# Patient Record
Sex: MALE | Race: WHITE | Employment: OTHER | URBAN - METROPOLITAN AREA
[De-identification: names, ages, dates, MRNs, and addresses within clinical notes are randomized per-mention and may not be internally consistent; named-entity substitution may affect disease eponyms.]

---

## 2023-08-07 SDOH — ECONOMIC STABILITY: FOOD INSECURITY: WITHIN THE PAST 12 MONTHS, YOU WORRIED THAT YOUR FOOD WOULD RUN OUT BEFORE YOU GOT MONEY TO BUY MORE.: NEVER TRUE

## 2023-08-07 SDOH — ECONOMIC STABILITY: TRANSPORTATION INSECURITY
IN THE PAST 12 MONTHS, HAS LACK OF TRANSPORTATION KEPT YOU FROM MEETINGS, WORK, OR FROM GETTING THINGS NEEDED FOR DAILY LIVING?: NO

## 2023-08-07 SDOH — ECONOMIC STABILITY: INCOME INSECURITY: HOW HARD IS IT FOR YOU TO PAY FOR THE VERY BASICS LIKE FOOD, HOUSING, MEDICAL CARE, AND HEATING?: NOT HARD AT ALL

## 2023-08-07 SDOH — ECONOMIC STABILITY: FOOD INSECURITY: WITHIN THE PAST 12 MONTHS, THE FOOD YOU BOUGHT JUST DIDN'T LAST AND YOU DIDN'T HAVE MONEY TO GET MORE.: NEVER TRUE

## 2023-08-07 SDOH — ECONOMIC STABILITY: HOUSING INSECURITY
IN THE LAST 12 MONTHS, WAS THERE A TIME WHEN YOU DID NOT HAVE A STEADY PLACE TO SLEEP OR SLEPT IN A SHELTER (INCLUDING NOW)?: NO

## 2023-08-08 ENCOUNTER — OFFICE VISIT (OUTPATIENT)
Age: 73
End: 2023-08-08
Payer: MEDICARE

## 2023-08-08 VITALS
BODY MASS INDEX: 26.7 KG/M2 | DIASTOLIC BLOOD PRESSURE: 71 MMHG | WEIGHT: 176.2 LBS | HEART RATE: 57 BPM | SYSTOLIC BLOOD PRESSURE: 143 MMHG | RESPIRATION RATE: 12 BRPM | HEIGHT: 68 IN

## 2023-08-08 DIAGNOSIS — N40.0 BENIGN PROSTATIC HYPERPLASIA, UNSPECIFIED WHETHER LOWER URINARY TRACT SYMPTOMS PRESENT: ICD-10-CM

## 2023-08-08 DIAGNOSIS — N62 GYNECOMASTIA, MALE: ICD-10-CM

## 2023-08-08 DIAGNOSIS — I10 PRIMARY HYPERTENSION: Primary | ICD-10-CM

## 2023-08-08 DIAGNOSIS — E78.2 MIXED HYPERLIPIDEMIA: ICD-10-CM

## 2023-08-08 DIAGNOSIS — M72.0 DUPUYTREN CONTRACTURE: ICD-10-CM

## 2023-08-08 PROBLEM — E78.5 HYPERLIPIDEMIA: Status: ACTIVE | Noted: 2023-08-08

## 2023-08-08 PROCEDURE — 1123F ACP DISCUSS/DSCN MKR DOCD: CPT | Performed by: FAMILY MEDICINE

## 2023-08-08 PROCEDURE — 3077F SYST BP >= 140 MM HG: CPT | Performed by: FAMILY MEDICINE

## 2023-08-08 PROCEDURE — 99213 OFFICE O/P EST LOW 20 MIN: CPT | Performed by: FAMILY MEDICINE

## 2023-08-08 PROCEDURE — 3078F DIAST BP <80 MM HG: CPT | Performed by: FAMILY MEDICINE

## 2023-08-08 RX ORDER — SILICONE ADHESIVE 1.5" X 3"
1 SHEET (EA) TOPICAL DAILY
COMMUNITY

## 2023-08-08 RX ORDER — VIT C/B6/B5/MAGNESIUM/HERB 173 50-5-6-5MG
1000 CAPSULE ORAL
COMMUNITY
Start: 2021-09-23

## 2023-08-08 RX ORDER — CALCIUM CARBONATE/VITAMIN D3 600 MG-10
1200 TABLET ORAL
COMMUNITY
Start: 2021-09-23

## 2023-08-08 RX ORDER — CEPHRADINE 500 MG
1 CAPSULE ORAL EVERY OTHER DAY
COMMUNITY

## 2023-08-08 RX ORDER — SILDENAFIL 25 MG/1
25 TABLET, FILM COATED ORAL DAILY PRN
COMMUNITY
End: 2023-08-08

## 2023-08-08 RX ORDER — ASCORBIC ACID 500 MG
50 TABLET ORAL DAILY
COMMUNITY
Start: 2021-09-23

## 2023-08-08 RX ORDER — M-VIT,TX,IRON,MINS/CALC/FOLIC 27MG-0.4MG
1 TABLET ORAL DAILY
COMMUNITY

## 2023-08-08 RX ORDER — MULTIVITAMIN WITH IRON
8000 TABLET ORAL DAILY
COMMUNITY

## 2023-08-08 RX ORDER — ASCORBIC ACID 250 MG
250 TABLET ORAL
COMMUNITY
Start: 2021-09-23

## 2023-08-08 RX ORDER — ASPIRIN 81 MG/1
81 TABLET, CHEWABLE ORAL DAILY
Qty: 30 TABLET | Refills: 3 | Status: SHIPPED | OUTPATIENT
Start: 2023-08-08

## 2023-08-08 ASSESSMENT — ENCOUNTER SYMPTOMS
SINUS PRESSURE: 0
FACIAL SWELLING: 0
DIARRHEA: 0
APNEA: 0
SINUS PAIN: 0
BLOOD IN STOOL: 0
CONSTIPATION: 0
PHOTOPHOBIA: 0
CHEST TIGHTNESS: 0
WHEEZING: 0
BACK PAIN: 0
COUGH: 0

## 2023-08-08 ASSESSMENT — PATIENT HEALTH QUESTIONNAIRE - PHQ9
SUM OF ALL RESPONSES TO PHQ QUESTIONS 1-9: 0
SUM OF ALL RESPONSES TO PHQ9 QUESTIONS 1 & 2: 0
SUM OF ALL RESPONSES TO PHQ QUESTIONS 1-9: 0
2. FEELING DOWN, DEPRESSED OR HOPELESS: 0
SUM OF ALL RESPONSES TO PHQ QUESTIONS 1-9: 0
1. LITTLE INTEREST OR PLEASURE IN DOING THINGS: 0
SUM OF ALL RESPONSES TO PHQ QUESTIONS 1-9: 0

## 2023-08-08 NOTE — ASSESSMENT & PLAN NOTE
If you are not having more than 2 episodes at night to get up and no significant change in stream then a PSA is not indicated based on age.

## 2023-08-08 NOTE — PATIENT INSTRUCTIONS
We will arrange for you to have some fasting testing. Based on your testing will determine if statin is still indicated. For now we will continue to monitor your blood pressures at home and add chewable aspirin daily. An average BP at home of 120/74 is excellent. Have placed referral in for Dr. Dionte Gore orthopedics to evaluate your Dupuytren's disease.

## 2023-08-08 NOTE — ASSESSMENT & PLAN NOTE
Bring you cuff in for every exam.  Then we use the cuff as our method. Add the 81 mg of chewable every night.

## 2023-08-08 NOTE — ASSESSMENT & PLAN NOTE
Greater than 110 depress loss of the 5th finger and over 90 degrees of the left ring, about 40 degrees of the right ring. Right hand dominant. Want to find out who I would recommend in [facility].

## 2023-08-08 NOTE — PROGRESS NOTES
[facility]  [address] [facility], [address] [facility]  Phone: [phone]  Fax: [phone]       Date of Visit:  2023  Patient Name: Khadra Chavez   Patient :  1950     CHIEF COMPLAINT:     Khadra Chavez is a 75 y.o. male who presents today for an general visit to be evaluated for the following condition(s):    -Follow-up hypertension  -Contracture left greater than right palm. REVIEW OF SYSTEM      Review of Systems   Constitutional:  Negative for activity change, appetite change, fatigue and fever. HENT:  Negative for congestion, facial swelling, hearing loss, nosebleeds, sinus pressure and sinus pain. Eyes:  Negative for photophobia and visual disturbance. Respiratory:  Negative for apnea, cough, chest tightness and wheezing. Cardiovascular:  Negative for chest pain, palpitations and leg swelling. Gastrointestinal:  Negative for blood in stool, constipation and diarrhea. Endocrine: Negative for cold intolerance and heat intolerance. Genitourinary:  Negative for decreased urine volume, difficulty urinating, dysuria and urgency. Musculoskeletal:  Negative for back pain, gait problem and neck pain. Contracture left greater than right hand   Skin:  Negative for rash and wound. Neurological:  Negative for dizziness, seizures, weakness, light-headedness and headaches. Hematological:  Negative for adenopathy. Does not bruise/bleed easily. Psychiatric/Behavioral:  Negative for decreased concentration and sleep disturbance. HISTORY OF PRESENT ILLNESS     HPI    Patient presents for interval care. Chief complaint recheck hypertension. He was last seen by Dr. Con Keating for an acute problem on 2022. At that time he was seen for lump in left breast.     Plan at that time was to obtain mammogram and ultrasound of the left breast.  Patient had mammogram on 2022.   Findings suggested

## 2023-08-08 NOTE — PROGRESS NOTES
[address] [address]     Date of Visit:  2023  Patient Name: David Kraft   Patient :  1950     CHIEF COMPLAINT:     David Kraft is a 75 y.o. male who presents today for an general visit to be evaluated for the following condition(s):  No chief complaint on file. REVIEW OF SYSTEM      Review of Systems    HISTORY OF PRESENT ILLNESS     Patient presents for interval care. Chief complaint recheck hypertension. He was last seen by Dr. Ken Patricia for an acute problem on 2022. At that time he was seen for lump in left breast.    Plan at that time was to obtain mammogram and ultrasound of the left breast.  Patient had mammogram on 2022. Findings suggested scattered areas of fibroglandular density well delineated both breasts benign in appearance findings were most consistent left breast retroareolar with the presence of gynecomastia. No imaging or follow-up indicated. Prolactin and testosterone levels were requested however the results were pending at the time of this dictation. At his last visit his weight was 176 pounds 5 foot 8-1/4. BP was 154/72. Repeat 151/72. Heart rate was 59. Comprehensive metabolic panel was in . Last lipid was 2021. At that time his total cholesterol was 264. HDL 60. . Triglycerides 146. VLDL 29.      REVIEWED INFORMATION      Not on File    There is no problem list on file for this patient. Past Medical History:   Diagnosis Date    Benign prostatic hyperplasia     Diverticulosis of intestine, part unspecified, without perforation or abscess without bleeding     Essential hypertension     Osteoarthritis of right knee        Past Surgical History:   Procedure Laterality Date    TOTAL KNEE ARTHROPLASTY Right     VASECTOMY       ?         Social History     Socioeconomic History    Marital status:    Tobacco Use    Smoking status:

## 2023-08-10 LAB
ALBUMIN SERPL-MCNC: NORMAL G/DL
ALP BLD-CCNC: NORMAL U/L
ALT SERPL-CCNC: NORMAL U/L
ANION GAP SERPL CALCULATED.3IONS-SCNC: NORMAL MMOL/L
AST SERPL-CCNC: NORMAL U/L
BILIRUB SERPL-MCNC: NORMAL MG/DL
BUN BLDV-MCNC: NORMAL MG/DL
CALCIUM SERPL-MCNC: NORMAL MG/DL
CHLORIDE BLD-SCNC: NORMAL MMOL/L
CHOLESTEROL, FASTING: NORMAL
CO2: NORMAL
CREAT SERPL-MCNC: NORMAL MG/DL
EGFR: NORMAL
GLUCOSE BLD-MCNC: NORMAL MG/DL
HDLC SERPL-MCNC: NORMAL MG/DL
LDL CHOLESTEROL CALCULATED: NORMAL
POTASSIUM SERPL-SCNC: NORMAL MMOL/L
SODIUM BLD-SCNC: NORMAL MMOL/L
TOTAL PROTEIN: NORMAL
TRIGLYCERIDE, FASTING: NORMAL
TSH SERPL DL<=0.05 MIU/L-ACNC: NORMAL M[IU]/L

## 2023-08-11 ENCOUNTER — TELEPHONE (OUTPATIENT)
Age: 73
End: 2023-08-11

## 2023-08-11 DIAGNOSIS — I10 PRIMARY HYPERTENSION: ICD-10-CM

## 2023-08-11 DIAGNOSIS — E03.9 ACQUIRED HYPOTHYROIDISM: Primary | ICD-10-CM

## 2023-08-11 DIAGNOSIS — E78.2 MIXED HYPERLIPIDEMIA: ICD-10-CM

## 2023-08-11 RX ORDER — LEVOTHYROXINE SODIUM 0.07 MG/1
75 TABLET ORAL DAILY
Qty: 90 TABLET | Refills: 1 | Status: SHIPPED | OUTPATIENT
Start: 2023-08-11

## 2023-08-11 NOTE — TELEPHONE ENCOUNTER
Spoke with patient and advised him of the findings. Also that we were sending script in as well as lab work in 6 weeks.

## 2023-11-06 ENCOUNTER — HOSPITAL ENCOUNTER (OUTPATIENT)
Age: 73
Setting detail: SPECIMEN
Discharge: HOME | End: 2023-11-06

## 2023-11-06 DIAGNOSIS — E03.9 ACQUIRED HYPOTHYROIDISM: ICD-10-CM

## 2023-11-06 LAB — TSH SERPL DL<=0.05 MIU/L-ACNC: 2.61 UIU/ML (ref 0.3–5)

## 2023-11-10 SDOH — HEALTH STABILITY: PHYSICAL HEALTH: ON AVERAGE, HOW MANY DAYS PER WEEK DO YOU ENGAGE IN MODERATE TO STRENUOUS EXERCISE (LIKE A BRISK WALK)?: 3 DAYS

## 2023-11-10 ASSESSMENT — PATIENT HEALTH QUESTIONNAIRE - PHQ9
SUM OF ALL RESPONSES TO PHQ QUESTIONS 1-9: 0
SUM OF ALL RESPONSES TO PHQ9 QUESTIONS 1 & 2: 0
SUM OF ALL RESPONSES TO PHQ QUESTIONS 1-9: 0
2. FEELING DOWN, DEPRESSED OR HOPELESS: 0
1. LITTLE INTEREST OR PLEASURE IN DOING THINGS: 0
SUM OF ALL RESPONSES TO PHQ QUESTIONS 1-9: 0
SUM OF ALL RESPONSES TO PHQ QUESTIONS 1-9: 0

## 2023-11-10 ASSESSMENT — LIFESTYLE VARIABLES
HOW OFTEN DO YOU HAVE SIX OR MORE DRINKS ON ONE OCCASION: 1
HOW OFTEN DO YOU HAVE A DRINK CONTAINING ALCOHOL: 2
HOW MANY STANDARD DRINKS CONTAINING ALCOHOL DO YOU HAVE ON A TYPICAL DAY: 1
HOW OFTEN DO YOU HAVE A DRINK CONTAINING ALCOHOL: MONTHLY OR LESS
HOW MANY STANDARD DRINKS CONTAINING ALCOHOL DO YOU HAVE ON A TYPICAL DAY: 1 OR 2

## 2023-11-13 ENCOUNTER — OFFICE VISIT (OUTPATIENT)
Age: 73
End: 2023-11-13
Payer: MEDICARE

## 2023-11-13 VITALS
SYSTOLIC BLOOD PRESSURE: 170 MMHG | WEIGHT: 174.3 LBS | DIASTOLIC BLOOD PRESSURE: 90 MMHG | BODY MASS INDEX: 24.95 KG/M2 | RESPIRATION RATE: 12 BRPM | HEART RATE: 76 BPM | HEIGHT: 70 IN

## 2023-11-13 DIAGNOSIS — E78.00 PURE HYPERCHOLESTEROLEMIA: ICD-10-CM

## 2023-11-13 DIAGNOSIS — I10 PRIMARY HYPERTENSION: ICD-10-CM

## 2023-11-13 DIAGNOSIS — Z00.00 MEDICARE ANNUAL WELLNESS VISIT, SUBSEQUENT: Primary | ICD-10-CM

## 2023-11-13 DIAGNOSIS — Z00.00 INITIAL MEDICARE ANNUAL WELLNESS VISIT: ICD-10-CM

## 2023-11-13 PROCEDURE — 3078F DIAST BP <80 MM HG: CPT | Performed by: FAMILY MEDICINE

## 2023-11-13 PROCEDURE — G0439 PPPS, SUBSEQ VISIT: HCPCS | Performed by: FAMILY MEDICINE

## 2023-11-13 PROCEDURE — 3074F SYST BP LT 130 MM HG: CPT | Performed by: FAMILY MEDICINE

## 2023-11-13 PROCEDURE — 1123F ACP DISCUSS/DSCN MKR DOCD: CPT | Performed by: FAMILY MEDICINE

## 2023-11-13 RX ORDER — MILK THISTLE 150 MG
1 CAPSULE ORAL DAILY
COMMUNITY

## 2023-11-13 RX ORDER — ROSUVASTATIN CALCIUM 10 MG/1
10 TABLET, COATED ORAL DAILY
Qty: 90 TABLET | Refills: 1 | Status: SHIPPED | OUTPATIENT
Start: 2023-11-13

## 2023-11-13 RX ORDER — LOSARTAN POTASSIUM 50 MG/1
50 TABLET ORAL DAILY
Qty: 90 TABLET | Refills: 1 | Status: SHIPPED | OUTPATIENT
Start: 2023-11-13

## 2023-11-13 RX ORDER — ACETAMINOPHEN 160 MG
2000 TABLET,DISINTEGRATING ORAL DAILY
COMMUNITY

## 2023-11-13 ASSESSMENT — PATIENT HEALTH QUESTIONNAIRE - PHQ9
SUM OF ALL RESPONSES TO PHQ QUESTIONS 1-9: 0
2. FEELING DOWN, DEPRESSED OR HOPELESS: 0
SUM OF ALL RESPONSES TO PHQ QUESTIONS 1-9: 0
1. LITTLE INTEREST OR PLEASURE IN DOING THINGS: 0
SUM OF ALL RESPONSES TO PHQ9 QUESTIONS 1 & 2: 0
SUM OF ALL RESPONSES TO PHQ QUESTIONS 1-9: 0
SUM OF ALL RESPONSES TO PHQ QUESTIONS 1-9: 0

## 2023-11-13 ASSESSMENT — LIFESTYLE VARIABLES
HOW OFTEN DO YOU HAVE A DRINK CONTAINING ALCOHOL: MONTHLY OR LESS
HOW MANY STANDARD DRINKS CONTAINING ALCOHOL DO YOU HAVE ON A TYPICAL DAY: 1 OR 2

## 2023-11-13 NOTE — ASSESSMENT & PLAN NOTE
No controlled. Need a consistent readings in evening < 130/80. Watch salt intake and increase activity. Walking briskly is safe and effective. Will start on Losartan. 50 mg once a day.   Call with BP readings in the next 4 weeks before you leave for [place]

## 2023-11-13 NOTE — PATIENT INSTRUCTIONS
Medicare Service Recommended Frequency Last Done Due next   Pneumonia vaccine After 72, Prevnar 20 ONCE Please look into when you had your last pneumonia vaccine and which one it was. You are due for Prevnar 20    Influenza vaccine Yearly Your most recent flu shot was on 10/20/2020 You are due for your flu shot today   Zoster/Shingles Shingrix vaccine:  2 shots 2-6 months apart  We have no record of a shingles vaccine for you You are due for the Shingrix. It is a 2 shot series that can be obtained at the pharmacy   COVID Variable We have no record of a COVID-vaccine for you Please consider getting a COVID-vaccine   Tetanus vaccine (Td or Tdap) Every 10 years  Your most recent Tdap was on 10/8/2020 These are \"good \"for 10 years so you will be due after 10/8/2030   Mammogram Every 1-2 years (ages ~45-78)  Please follow-up as indicated with Dr. Starr Nowak test yearly, Cologuard every 3 years, Colonoscopy every 10 years *unless otherwise indicated* You noted that you had a colonoscopy in 2021 in [place].   It showed that you needed a repeat in 5 years so after 4/1/2026   Prostate Cancer Shared decision making with patient depending on family history and risk Per Dr. Lewis Carrel, no need for a PSA at this time    Cardiovascular/cholesterol screening As determined by your physician 8/10/2023  Total cholesterol: 229  Triglycerides: 116  HDL (good cholesterol): 52  LDL (bad cholesterol): 154 As determined by your physician   Diabetes screening   As determined by your physician 8/10/2023  A1c/glucose: 80 As determined by your physician   Glaucoma screening       Covered yearly for those:  with diabetes mellitus  with a family history of glaucoma  -Americans aged 48 and older  -Americans aged 72 and older You noted you have not seen the eye doctor in a year Please make an appointment to see the eye doctor   HIV/Hepatitis C/STI testing STI/HIV:  Annually as necessary  Hepatitis C:  Once

## 2023-11-13 NOTE — ASSESSMENT & PLAN NOTE
Not acceptable. Already on a reasonable diet. Triglycerides are good. Intermediate risk and will start on low dose Crestor 10 mg per day. Add CoEnzyme Q 10 100 mg per day to reduce chance of muscle fatigue. We will repeat your lipid in 6 months.

## 2023-11-13 NOTE — PROGRESS NOTES
Medicare Service Recommended Frequency Last Done Due next   Pneumonia vaccine After 72, Prevnar 20 ONCE Please look into when you had your last pneumonia vaccine and which one it was. You are due for Prevnar 20    Influenza vaccine Yearly Your most recent flu shot was on 10/20/2020 You are due for your flu shot today   Zoster/Shingles Shingrix vaccine:  2 shots 2-6 months apart  We have no record of a shingles vaccine for you You are due for the Shingrix. It is a 2 shot series that can be obtained at the pharmacy   COVID Variable We have no record of a COVID-vaccine for you Please consider getting a COVID-vaccine   Tetanus vaccine (Td or Tdap) Every 10 years  Your most recent Tdap was on 10/8/2020 These are \"good \"for 10 years so you will be due after 10/8/2030   Mammogram Every 1-2 years (ages ~45-78)  Please follow-up as indicated with Dr. Oniel Recinos test yearly, Cologuard every 3 years, Colonoscopy every 10 years *unless otherwise indicated* You noted that you had a colonoscopy in 2021 in [place].   It showed that you needed a repeat in 5 years so after 4/1/2026   Prostate Cancer Shared decision making with patient depending on family history and risk Per Dr. Cheikh Avelar, no need for a PSA at this time    Cardiovascular/cholesterol screening As determined by your physician 8/10/2023  Total cholesterol: 229  Triglycerides: 116  HDL (good cholesterol): 52  LDL (bad cholesterol): 154 As determined by your physician   Diabetes screening   As determined by your physician 8/10/2023  A1c/glucose: 80 As determined by your physician   Glaucoma screening       Covered yearly for those:  with diabetes mellitus  with a family history of glaucoma  -Americans aged 48 and older  -Americans aged 72 and older You noted you have not seen the eye doctor in a year Please make an appointment to see the eye doctor   HIV/Hepatitis C/STI testing STI/HIV:  Annually as necessary  Hepatitis C:  Once
6 months (around 5/13/2024) for Call in 4 weeks with average am and pm readings. Add CoEnzyme Q10 100 mg per day to avoid fatig.    - Questions/concerns answered. Patient verbalized and expressed understanding. Medications, laboratory testing, imaging, consultation, and follow up as documented in this encounter. Please be aware portions of this note were completed using voice recognition software and unforeseen errors may have occurred    I personally reviewed the patient's past medical history, current medications, allergies, surgical history, family history and social history. Updates were made as necessary.     Electronically signed by Marlin Murphy MD on 11/13/2023 at 3:25 PM

## 2024-02-06 DIAGNOSIS — E03.9 ACQUIRED HYPOTHYROIDISM: ICD-10-CM

## 2024-02-06 RX ORDER — LEVOTHYROXINE SODIUM 0.07 MG/1
75 TABLET ORAL DAILY
Qty: 90 TABLET | Refills: 1 | Status: SHIPPED | OUTPATIENT
Start: 2024-02-06 | End: 2024-02-07 | Stop reason: SDUPTHER

## 2024-02-07 DIAGNOSIS — E03.9 ACQUIRED HYPOTHYROIDISM: ICD-10-CM

## 2024-02-07 RX ORDER — LEVOTHYROXINE SODIUM 0.07 MG/1
75 TABLET ORAL DAILY
Qty: 90 TABLET | Refills: 3 | Status: SHIPPED | OUTPATIENT
Start: 2024-02-07 | End: 2024-02-09 | Stop reason: SDUPTHER

## 2024-02-09 ENCOUNTER — PATIENT MESSAGE (OUTPATIENT)
Age: 74
End: 2024-02-09

## 2024-02-09 DIAGNOSIS — E03.9 ACQUIRED HYPOTHYROIDISM: ICD-10-CM

## 2024-02-09 RX ORDER — LEVOTHYROXINE SODIUM 0.07 MG/1
75 TABLET ORAL DAILY
Qty: 90 TABLET | Refills: 3 | Status: SHIPPED | OUTPATIENT
Start: 2024-02-09

## 2024-02-12 DIAGNOSIS — E78.00 PURE HYPERCHOLESTEROLEMIA: ICD-10-CM

## 2024-02-12 DIAGNOSIS — I10 PRIMARY HYPERTENSION: ICD-10-CM

## 2024-02-12 RX ORDER — LOSARTAN POTASSIUM 50 MG/1
50 TABLET ORAL DAILY
Qty: 90 TABLET | Refills: 1 | Status: SHIPPED | OUTPATIENT
Start: 2024-02-12

## 2024-02-12 RX ORDER — ROSUVASTATIN CALCIUM 10 MG/1
10 TABLET, COATED ORAL DAILY
Qty: 90 TABLET | Refills: 1 | Status: SHIPPED | OUTPATIENT
Start: 2024-02-12

## 2024-04-30 ENCOUNTER — TELEPHONE (OUTPATIENT)
Age: 74
End: 2024-04-30

## 2024-04-30 DIAGNOSIS — E78.2 MIXED HYPERLIPIDEMIA: ICD-10-CM

## 2024-04-30 DIAGNOSIS — E03.9 ACQUIRED HYPOTHYROIDISM: ICD-10-CM

## 2024-04-30 DIAGNOSIS — I10 PRIMARY HYPERTENSION: Primary | ICD-10-CM

## 2024-04-30 NOTE — TELEPHONE ENCOUNTER
Patient called and wanted to know if he needs to have any labs drawn before his appointment next week?

## 2024-05-01 NOTE — TELEPHONE ENCOUNTER
Patient was last comprehensive blood work was in August 2023.  At that time his cholesterol is fairly high so was his TSH.  We made adjustments in the dose of his thyroid supplement at his next visit his TSH was back down.  Good idea to check repeat TSH every 6 months x 2 and then go back to yearly once you have identified the change.  Is also a good idea to check a repeat fasting lipid when you have corrected the TSH.  He would have been done for 1 and August anyway however we will repeat now they will give us the rest of the summer

## 2024-05-03 ENCOUNTER — HOSPITAL ENCOUNTER (OUTPATIENT)
Age: 74
Discharge: HOME | End: 2024-05-03
Payer: MEDICARE

## 2024-05-03 DIAGNOSIS — E03.9 ACQUIRED HYPOTHYROIDISM: ICD-10-CM

## 2024-05-03 DIAGNOSIS — I10 PRIMARY HYPERTENSION: ICD-10-CM

## 2024-05-03 DIAGNOSIS — E78.2 MIXED HYPERLIPIDEMIA: ICD-10-CM

## 2024-05-03 LAB
ALBUMIN SERPL-MCNC: 4.1 G/DL (ref 3.5–5.2)
ALBUMIN/GLOB SERPL: 2 {RATIO} (ref 1–2.5)
ALP SERPL-CCNC: 53 U/L (ref 40–129)
ALT SERPL-CCNC: 15 U/L (ref 10–50)
ANION GAP SERPL CALCULATED.3IONS-SCNC: 10 MMOL/L (ref 9–16)
AST SERPL-CCNC: 27 U/L (ref 10–50)
BILIRUB SERPL-MCNC: 0.3 MG/DL (ref 0–1.2)
BUN SERPL-MCNC: 16 MG/DL (ref 8–23)
CALCIUM SERPL-MCNC: 9.2 MG/DL (ref 8.6–10.4)
CHLORIDE SERPL-SCNC: 104 MMOL/L (ref 98–107)
CHOLEST SERPL-MCNC: 167 MG/DL (ref 0–199)
CHOLESTEROL/HDL RATIO: 3
CO2 SERPL-SCNC: 27 MMOL/L (ref 20–31)
CREAT SERPL-MCNC: 1 MG/DL (ref 0.7–1.2)
GFR, ESTIMATED: 81 ML/MIN/1.73M2
GLUCOSE SERPL-MCNC: 95 MG/DL (ref 74–99)
HDLC SERPL-MCNC: 54 MG/DL
LDLC SERPL CALC-MCNC: 95 MG/DL (ref 0–100)
POTASSIUM SERPL-SCNC: 4.7 MMOL/L (ref 3.7–5.3)
PROT SERPL-MCNC: 6.3 G/DL (ref 6.6–8.7)
SODIUM SERPL-SCNC: 141 MMOL/L (ref 136–145)
TRIGL SERPL-MCNC: 88 MG/DL (ref 0–149)
TSH SERPL DL<=0.05 MIU/L-ACNC: 3.33 UIU/ML (ref 0.27–4.2)
VLDLC SERPL CALC-MCNC: 18 MG/DL

## 2024-05-03 PROCEDURE — 80053 COMPREHEN METABOLIC PANEL: CPT

## 2024-05-03 PROCEDURE — 80061 LIPID PANEL: CPT

## 2024-05-03 PROCEDURE — 36415 COLL VENOUS BLD VENIPUNCTURE: CPT

## 2024-05-03 PROCEDURE — 84443 ASSAY THYROID STIM HORMONE: CPT

## 2024-05-07 NOTE — PATIENT INSTRUCTIONS
It was nice seeing you today.  Remember some of the things we talked about.  Most importantly be gentle to yourself.      Remember the 4 components of wellness:  1.  Restoration: This includes time to restore ones energy.  This includes a minimum of 7-8 quality hours of sleep at night.  Passing out because you are exhausted is not followed by good sleep.  Falling asleep as part of preparing for sleep provides the best sleep.  Poor quality cannot be made up by an increase in quantity.  2.  Nutrition: Mediterranean diet or plant strong diet that involves as much food as you can that is fresh without being predigested to extend shelf life or flavor enhanced in general is the best.  My plate.gov is another resource.  Make sure you are getting an adequate amount of protein in your diet.  In addition sufficient enough water.  3.  Activity or Motion: You need a minimum of 150 to 180 minutes/week at target heart rate.  Maximum heart rate is 220 - age.  Target heart rate is 65 to 80% of maximum heart rate.  This is to maintain your present level of fitness.  To lose weight you need up to 300 minutes/week to maintain your metabolism if and negative caloric balance.  Counting your steps is great, but just activity is not as good as heart rate in the target range.  If there is a change in how you feel at a given heart rate that is a reason to be seen.  4.  Connection with others or purpose driven life: Being connected with other people and having a purpose extends your life expectancy and provides meaning to your life.  Helping others always provides benefit to the person providing that help.    These are the 4 major quadrants of wellness.  The Bull's-Eye however is Mindfulness.  This is where you make a point every day to look at where you are at in relation to those for major quadrants.  It keeps you on track.  Ignoring 1 2 or 3 major quadrants for too long will lead to less then optimal health.     Your wellness exam is due

## 2024-05-07 NOTE — PROGRESS NOTES
[facility]  [address] [place] [address]  Phone: [phone]  Fax: [phone]      Date of Visit:  24  Patient Name: Chase Hunt   Patient :  1950     ASSESSMENT/PLAN     1. Primary hypertension  Assessment & Plan:  Continue current medication of losartan 50 mg tablet daily make sure you stay well-hydrated.  If your blood pressure begins to drop any further at that point in time we will probably plan on reducing the losartan from 50 mg to 25 mg/day   2. Acquired hypothyroidism  Assessment & Plan:  Your TSH is within normal limits.  Would not need to repeat for 1 year unless you would develop significant changes in energy or weight.    3. Mixed hyperlipidemia  Assessment & Plan:  Cholesterol is excellent.         Return in about 6 months (around 2024) for Hypertension, hyperlipidemia,.    - Questions/concerns answered. Patient verbalized and expressed understanding. Medications, laboratory testing, imaging, consultation, and follow up as documented in this encounter.       Please be aware portions of this note were completed using voice recognition software and unforeseen errors may have occurred    I personally reviewed the patient's past medical history, current medications, allergies, surgical history, family history and social history.  Updates were made as necessary.    Electronically signed by Pelon Avila MD on 2024 at 10:14 AM        HPI:     Chase Hunt is a 74 y.o. male with   Patient Active Problem List   Diagnosis    Benign prostatic hyperplasia    Hyperlipidemia    Primary hypertension    Diverticulosis of intestine, part unspecified, without perforation or abscess without bleeding    Gynecomastia, male    Dupuytren contracture    Acquired hypothyroidism     who presents today to discuss   Chief Complaint   Patient presents with    Follow-up    Hypertension       At last visit: Patient was last seen

## 2024-05-08 ENCOUNTER — OFFICE VISIT (OUTPATIENT)
Age: 74
End: 2024-05-08
Payer: MEDICARE

## 2024-05-08 VITALS
SYSTOLIC BLOOD PRESSURE: 141 MMHG | RESPIRATION RATE: 16 BRPM | HEIGHT: 68 IN | HEART RATE: 62 BPM | DIASTOLIC BLOOD PRESSURE: 60 MMHG | BODY MASS INDEX: 25.4 KG/M2 | TEMPERATURE: 97.8 F | WEIGHT: 167.6 LBS

## 2024-05-08 DIAGNOSIS — E78.2 MIXED HYPERLIPIDEMIA: ICD-10-CM

## 2024-05-08 DIAGNOSIS — E03.9 ACQUIRED HYPOTHYROIDISM: ICD-10-CM

## 2024-05-08 DIAGNOSIS — I10 PRIMARY HYPERTENSION: Primary | ICD-10-CM

## 2024-05-08 PROCEDURE — 3078F DIAST BP <80 MM HG: CPT | Performed by: FAMILY MEDICINE

## 2024-05-08 PROCEDURE — 3077F SYST BP >= 140 MM HG: CPT | Performed by: FAMILY MEDICINE

## 2024-05-08 PROCEDURE — 1123F ACP DISCUSS/DSCN MKR DOCD: CPT | Performed by: FAMILY MEDICINE

## 2024-05-08 PROCEDURE — 99213 OFFICE O/P EST LOW 20 MIN: CPT | Performed by: FAMILY MEDICINE

## 2024-05-08 PROCEDURE — 99213 OFFICE O/P EST LOW 20 MIN: CPT

## 2024-05-08 RX ORDER — AMOXICILLIN AND CLAVULANATE POTASSIUM 875; 125 MG/1; MG/1
1 TABLET, FILM COATED ORAL 2 TIMES DAILY
COMMUNITY
Start: 2024-05-06

## 2024-05-08 ASSESSMENT — ENCOUNTER SYMPTOMS
BLOOD IN STOOL: 0
COUGH: 0
DIARRHEA: 0
WHEEZING: 0
BACK PAIN: 0
SINUS PRESSURE: 0
APNEA: 0
CONSTIPATION: 0
CHEST TIGHTNESS: 0
SINUS PAIN: 0
FACIAL SWELLING: 0
PHOTOPHOBIA: 0

## 2024-05-08 ASSESSMENT — PATIENT HEALTH QUESTIONNAIRE - PHQ9
SUM OF ALL RESPONSES TO PHQ QUESTIONS 1-9: 0
1. LITTLE INTEREST OR PLEASURE IN DOING THINGS: NOT AT ALL
SUM OF ALL RESPONSES TO PHQ QUESTIONS 1-9: 0
2. FEELING DOWN, DEPRESSED OR HOPELESS: NOT AT ALL
SUM OF ALL RESPONSES TO PHQ QUESTIONS 1-9: 0
SUM OF ALL RESPONSES TO PHQ9 QUESTIONS 1 & 2: 0
SUM OF ALL RESPONSES TO PHQ QUESTIONS 1-9: 0

## 2024-05-08 NOTE — ASSESSMENT & PLAN NOTE
Your TSH is within normal limits.  Would not need to repeat for 1 year unless you would develop significant changes in energy or weight.

## 2024-05-08 NOTE — ASSESSMENT & PLAN NOTE
Continue current medication of losartan 50 mg tablet daily make sure you stay well-hydrated.  If your blood pressure begins to drop any further at that point in time we will probably plan on reducing the losartan from 50 mg to 25 mg/day

## 2024-05-18 DIAGNOSIS — E03.9 ACQUIRED HYPOTHYROIDISM: ICD-10-CM

## 2024-05-18 DIAGNOSIS — E78.00 PURE HYPERCHOLESTEROLEMIA: ICD-10-CM

## 2024-05-18 DIAGNOSIS — I10 PRIMARY HYPERTENSION: ICD-10-CM

## 2024-05-20 RX ORDER — LOSARTAN POTASSIUM 50 MG/1
50 TABLET ORAL DAILY
Qty: 90 TABLET | Refills: 3 | Status: SHIPPED | OUTPATIENT
Start: 2024-05-20

## 2024-05-20 RX ORDER — ROSUVASTATIN CALCIUM 10 MG/1
10 TABLET, COATED ORAL DAILY
Qty: 90 TABLET | Refills: 3 | Status: SHIPPED | OUTPATIENT
Start: 2024-05-20

## 2024-05-20 RX ORDER — LEVOTHYROXINE SODIUM 0.07 MG/1
75 TABLET ORAL DAILY
Qty: 90 TABLET | Refills: 3 | Status: SHIPPED | OUTPATIENT
Start: 2024-05-20

## 2024-09-23 DIAGNOSIS — E78.00 PURE HYPERCHOLESTEROLEMIA: ICD-10-CM

## 2024-09-23 DIAGNOSIS — E03.9 ACQUIRED HYPOTHYROIDISM: ICD-10-CM

## 2024-09-23 RX ORDER — ROSUVASTATIN CALCIUM 10 MG/1
10 TABLET, COATED ORAL DAILY
Qty: 90 TABLET | Refills: 3 | Status: SHIPPED | OUTPATIENT
Start: 2024-09-23

## 2024-09-23 RX ORDER — LEVOTHYROXINE SODIUM 75 UG/1
75 TABLET ORAL DAILY
Qty: 90 TABLET | Refills: 3 | Status: SHIPPED | OUTPATIENT
Start: 2024-09-23

## 2024-11-17 DIAGNOSIS — I10 PRIMARY HYPERTENSION: ICD-10-CM

## 2024-11-18 RX ORDER — LOSARTAN POTASSIUM 50 MG/1
50 TABLET ORAL DAILY
Qty: 90 TABLET | Refills: 3 | Status: SHIPPED | OUTPATIENT
Start: 2024-11-18

## 2024-11-25 ENCOUNTER — TELEPHONE (OUTPATIENT)
Age: 74
End: 2024-11-25

## 2024-11-25 NOTE — TELEPHONE ENCOUNTER
Has appointment on 12/12.  Asking if he needs any labs done before appointments.    Will have labs done at a [facility]

## 2024-12-11 PROBLEM — Z96.651 HISTORY OF ARTHROPLASTY OF RIGHT KNEE: Status: ACTIVE | Noted: 2024-12-11

## 2024-12-11 SDOH — ECONOMIC STABILITY: FOOD INSECURITY: WITHIN THE PAST 12 MONTHS, YOU WORRIED THAT YOUR FOOD WOULD RUN OUT BEFORE YOU GOT MONEY TO BUY MORE.: PATIENT DECLINED

## 2024-12-11 SDOH — ECONOMIC STABILITY: FOOD INSECURITY: WITHIN THE PAST 12 MONTHS, THE FOOD YOU BOUGHT JUST DIDN'T LAST AND YOU DIDN'T HAVE MONEY TO GET MORE.: PATIENT DECLINED

## 2024-12-11 SDOH — ECONOMIC STABILITY: INCOME INSECURITY: HOW HARD IS IT FOR YOU TO PAY FOR THE VERY BASICS LIKE FOOD, HOUSING, MEDICAL CARE, AND HEATING?: PATIENT DECLINED

## 2024-12-11 SDOH — ECONOMIC STABILITY: TRANSPORTATION INSECURITY
IN THE PAST 12 MONTHS, HAS LACK OF TRANSPORTATION KEPT YOU FROM MEETINGS, WORK, OR FROM GETTING THINGS NEEDED FOR DAILY LIVING?: PATIENT DECLINED

## 2024-12-11 NOTE — PROGRESS NOTES
personally reviewed the patient's past medical history, current medications, allergies, surgical history, family history and social history.  Updates were made as necessary.    Electronically signed by Pelon Avila MD on 12/15/2024 at 8:43 PM        HPI:     Chase Hunt is a 74 y.o. male with   Patient Active Problem List   Diagnosis    Benign prostatic hyperplasia    Hyperlipidemia    Essential (primary) hypertension    Diverticulosis of intestine, part unspecified, without perforation or abscess without bleeding    Gynecomastia, male    Dupuytren contracture    Acquired hypothyroidism    History of arthroplasty of right knee     who presents today to discuss   Chief Complaint   Patient presents with    Hypothyroidism     gdo    Hypertension    Immunizations     Due for HD Flu and Prevnar 20 declined both today VIS to pt       At last visit: Patient was last seen 5/8/2024.  At that visit he was seen for primary hypertension, acquired hypothyroidism, mixed hyperlipidemia.    We advised him to continue his current dose of losartan 50 mg once a day.  His blood pressure continues to improve we will consider plan on reducing losartan from 50 to 25 mg daily.  Suggest interval testing liver and kidney function at least yearly.    His TSH was within normal limits suggested not testing until 1 year or significant changes in energy or weight.  Continue on the levothyroxine 75 mcg daily.    Informed him that his mixed hyperlipidemia was excellent.  Advise continuing on the omega-3 1000 mg by mouth as well as the rosuvastatin 10 mg tablet daily.    Advised patient to follow-up in 6 months.    --------------------------------------------------------------------------------------------------    Interval HPI: Patient has been doing very well overall.  He denies any change in bowel bladder habits any constipation or diarrhea he has some questions with regards to whether or not he might be able to get by on a slightly lower

## 2024-12-12 ENCOUNTER — HOSPITAL ENCOUNTER (OUTPATIENT)
Age: 74
Discharge: HOME | End: 2024-12-12
Payer: MEDICARE

## 2024-12-12 ENCOUNTER — OFFICE VISIT (OUTPATIENT)
Age: 74
End: 2024-12-12
Payer: MEDICARE

## 2024-12-12 VITALS
HEART RATE: 71 BPM | BODY MASS INDEX: 26.92 KG/M2 | RESPIRATION RATE: 14 BRPM | DIASTOLIC BLOOD PRESSURE: 54 MMHG | SYSTOLIC BLOOD PRESSURE: 139 MMHG | TEMPERATURE: 97.6 F | HEIGHT: 68 IN | WEIGHT: 177.6 LBS

## 2024-12-12 DIAGNOSIS — I10 ESSENTIAL (PRIMARY) HYPERTENSION: Primary | ICD-10-CM

## 2024-12-12 DIAGNOSIS — E78.2 MIXED HYPERLIPIDEMIA: ICD-10-CM

## 2024-12-12 DIAGNOSIS — E03.9 ACQUIRED HYPOTHYROIDISM: ICD-10-CM

## 2024-12-12 LAB
T4 SERPL-MCNC: 8.1 UG/DL (ref 4.5–11.7)
TSH SERPL DL<=0.05 MIU/L-ACNC: 4.17 UIU/ML (ref 0.27–4.2)

## 2024-12-12 PROCEDURE — 3078F DIAST BP <80 MM HG: CPT | Performed by: FAMILY MEDICINE

## 2024-12-12 PROCEDURE — 84436 ASSAY OF TOTAL THYROXINE: CPT

## 2024-12-12 PROCEDURE — 1159F MED LIST DOCD IN RCRD: CPT | Performed by: FAMILY MEDICINE

## 2024-12-12 PROCEDURE — 36415 COLL VENOUS BLD VENIPUNCTURE: CPT

## 2024-12-12 PROCEDURE — 3075F SYST BP GE 130 - 139MM HG: CPT | Performed by: FAMILY MEDICINE

## 2024-12-12 PROCEDURE — 84443 ASSAY THYROID STIM HORMONE: CPT

## 2024-12-12 PROCEDURE — 1123F ACP DISCUSS/DSCN MKR DOCD: CPT | Performed by: FAMILY MEDICINE

## 2024-12-12 PROCEDURE — 99214 OFFICE O/P EST MOD 30 MIN: CPT | Performed by: FAMILY MEDICINE

## 2024-12-12 PROCEDURE — 99212 OFFICE O/P EST SF 10 MIN: CPT | Performed by: FAMILY MEDICINE

## 2024-12-12 RX ORDER — METHYLDOPA/HYDROCHLOROTHIAZIDE 250MG-15MG
1 TABLET ORAL DAILY
COMMUNITY
Start: 2024-06-01

## 2024-12-12 RX ORDER — LEVOTHYROXINE SODIUM 50 UG/1
75 TABLET ORAL DAILY
Qty: 90 TABLET | Refills: 1 | Status: SHIPPED | OUTPATIENT
Start: 2024-12-12

## 2024-12-12 NOTE — ASSESSMENT & PLAN NOTE
Will see if patient can tolerate a reduction of levothyroxine from 75 to 50 mcg/day we will check a TSH and T4 first prior to making the change.  Consider repeat TSH and T4 in 6 months with his annual blood work.

## 2024-12-12 NOTE — PATIENT INSTRUCTIONS
Will make the change in your levothyroxine.  There is some leeway that we can make.  Will check a TSH and T4 now to see where you are at exactly prior to change.  Should you develop increased problems with constipation or fatigue then we will immediately repeat a TSH and T4 then or wait until 6 months with your annual lab work.    Blood pressure and lipid well-controlled.  Have included a list of your care gaps that we might want to consider with your next lab draw.

## 2024-12-12 NOTE — ASSESSMENT & PLAN NOTE
Last lipid panel in May 2024 within acceptable limits we will continue present course advised on a regular program of exercise and attention to diet indicated.

## 2024-12-18 DIAGNOSIS — E78.00 PURE HYPERCHOLESTEROLEMIA: ICD-10-CM

## 2024-12-18 RX ORDER — ROSUVASTATIN CALCIUM 10 MG/1
10 TABLET, COATED ORAL DAILY
Qty: 90 TABLET | Refills: 3 | Status: SHIPPED | OUTPATIENT
Start: 2024-12-18

## 2025-02-17 DIAGNOSIS — I10 PRIMARY HYPERTENSION: ICD-10-CM

## 2025-02-17 RX ORDER — LOSARTAN POTASSIUM 50 MG/1
50 TABLET ORAL DAILY
Qty: 90 TABLET | Refills: 3 | Status: SHIPPED | OUTPATIENT
Start: 2025-02-17

## 2025-03-10 ENCOUNTER — TELEPHONE (OUTPATIENT)
Age: 75
End: 2025-03-10

## 2025-03-10 DIAGNOSIS — E03.9 ACQUIRED HYPOTHYROIDISM: ICD-10-CM

## 2025-03-10 RX ORDER — LEVOTHYROXINE SODIUM 50 UG/1
75 TABLET ORAL DAILY
Qty: 90 TABLET | Refills: 1 | Status: SHIPPED | OUTPATIENT
Start: 2025-03-10

## 2025-03-10 NOTE — TELEPHONE ENCOUNTER
Left patient a message to call the office back, if patient returns our phone call please schedule MAW visit with pharmacy team and send MAW packet to home if patient doesn't use Mychart. Thanks. CHEIKH

## 2025-04-07 DIAGNOSIS — E78.00 PURE HYPERCHOLESTEROLEMIA: ICD-10-CM

## 2025-04-07 RX ORDER — ROSUVASTATIN CALCIUM 10 MG/1
10 TABLET, COATED ORAL DAILY
Qty: 90 TABLET | Refills: 3 | Status: SHIPPED | OUTPATIENT
Start: 2025-04-07

## 2025-05-20 DIAGNOSIS — I10 PRIMARY HYPERTENSION: ICD-10-CM

## 2025-05-20 RX ORDER — LOSARTAN POTASSIUM 50 MG/1
50 TABLET ORAL DAILY
Qty: 90 TABLET | Refills: 3 | Status: SHIPPED | OUTPATIENT
Start: 2025-05-20

## 2025-06-10 DIAGNOSIS — E03.9 ACQUIRED HYPOTHYROIDISM: ICD-10-CM

## 2025-06-10 RX ORDER — LEVOTHYROXINE SODIUM 50 UG/1
75 TABLET ORAL DAILY
Qty: 90 TABLET | Refills: 1 | Status: SHIPPED | OUTPATIENT
Start: 2025-06-10

## 2025-07-11 SDOH — HEALTH STABILITY: PHYSICAL HEALTH: ON AVERAGE, HOW MANY DAYS PER WEEK DO YOU ENGAGE IN MODERATE TO STRENUOUS EXERCISE (LIKE A BRISK WALK)?: 5 DAYS

## 2025-07-11 SDOH — HEALTH STABILITY: PHYSICAL HEALTH: ON AVERAGE, HOW MANY MINUTES DO YOU ENGAGE IN EXERCISE AT THIS LEVEL?: 50 MIN

## 2025-07-11 ASSESSMENT — PATIENT HEALTH QUESTIONNAIRE - PHQ9
2. FEELING DOWN, DEPRESSED OR HOPELESS: NOT AT ALL
SUM OF ALL RESPONSES TO PHQ QUESTIONS 1-9: 0
1. LITTLE INTEREST OR PLEASURE IN DOING THINGS: NOT AT ALL
SUM OF ALL RESPONSES TO PHQ QUESTIONS 1-9: 0

## 2025-07-11 ASSESSMENT — LIFESTYLE VARIABLES
HOW OFTEN DO YOU HAVE SIX OR MORE DRINKS ON ONE OCCASION: 1
HOW MANY STANDARD DRINKS CONTAINING ALCOHOL DO YOU HAVE ON A TYPICAL DAY: 1 OR 2
HOW OFTEN DO YOU HAVE A DRINK CONTAINING ALCOHOL: MONTHLY OR LESS
HOW MANY STANDARD DRINKS CONTAINING ALCOHOL DO YOU HAVE ON A TYPICAL DAY: 1
HOW OFTEN DO YOU HAVE A DRINK CONTAINING ALCOHOL: 2

## 2025-07-15 SDOH — ECONOMIC STABILITY: FOOD INSECURITY: WITHIN THE PAST 12 MONTHS, THE FOOD YOU BOUGHT JUST DIDN'T LAST AND YOU DIDN'T HAVE MONEY TO GET MORE.: NEVER TRUE

## 2025-07-15 SDOH — ECONOMIC STABILITY: INCOME INSECURITY: IN THE LAST 12 MONTHS, WAS THERE A TIME WHEN YOU WERE NOT ABLE TO PAY THE MORTGAGE OR RENT ON TIME?: NO

## 2025-07-15 SDOH — ECONOMIC STABILITY: FOOD INSECURITY: WITHIN THE PAST 12 MONTHS, YOU WORRIED THAT YOUR FOOD WOULD RUN OUT BEFORE YOU GOT MONEY TO BUY MORE.: NEVER TRUE

## 2025-07-15 SDOH — ECONOMIC STABILITY: TRANSPORTATION INSECURITY
IN THE PAST 12 MONTHS, HAS THE LACK OF TRANSPORTATION KEPT YOU FROM MEDICAL APPOINTMENTS OR FROM GETTING MEDICATIONS?: NO

## 2025-07-18 ENCOUNTER — OFFICE VISIT (OUTPATIENT)
Age: 75
End: 2025-07-18
Payer: MEDICARE

## 2025-07-18 VITALS
HEART RATE: 62 BPM | WEIGHT: 171.6 LBS | TEMPERATURE: 97.6 F | SYSTOLIC BLOOD PRESSURE: 126 MMHG | BODY MASS INDEX: 26.01 KG/M2 | HEIGHT: 68 IN | DIASTOLIC BLOOD PRESSURE: 72 MMHG

## 2025-07-18 DIAGNOSIS — Z00.00 MEDICARE ANNUAL WELLNESS VISIT, SUBSEQUENT: Primary | ICD-10-CM

## 2025-07-18 PROCEDURE — 3074F SYST BP LT 130 MM HG: CPT

## 2025-07-18 PROCEDURE — 1126F AMNT PAIN NOTED NONE PRSNT: CPT

## 2025-07-18 PROCEDURE — 3078F DIAST BP <80 MM HG: CPT

## 2025-07-18 PROCEDURE — G0439 PPPS, SUBSEQ VISIT: HCPCS

## 2025-07-18 PROCEDURE — 1123F ACP DISCUSS/DSCN MKR DOCD: CPT

## 2025-07-18 PROCEDURE — 1159F MED LIST DOCD IN RCRD: CPT

## 2025-07-18 RX ORDER — VIT C/B6/B5/MAGNESIUM/HERB 173 50-5-6-5MG
500 CAPSULE ORAL DAILY
COMMUNITY
Start: 2025-01-01

## 2025-07-18 RX ORDER — AMPICILLIN TRIHYDRATE 250 MG
2 CAPSULE ORAL DAILY
COMMUNITY
Start: 2025-06-01

## 2025-07-18 RX ORDER — VITAMIN B COMPLEX
TABLET ORAL
COMMUNITY
Start: 2023-07-01

## 2025-07-18 SDOH — HEALTH STABILITY: MENTAL HEALTH
DO YOU FEEL STRESS - TENSE, RESTLESS, NERVOUS, OR ANXIOUS, OR UNABLE TO SLEEP AT NIGHT BECAUSE YOUR MIND IS TROUBLED ALL THE TIME - THESE DAYS?: NOT AT ALL

## 2025-07-18 SDOH — SOCIAL STABILITY: SOCIAL INSECURITY
WITHIN THE LAST YEAR, HAVE YOU BEEN RAPED OR FORCED TO HAVE ANY KIND OF SEXUAL ACTIVITY BY YOUR PARTNER OR EX-PARTNER?: NO

## 2025-07-18 SDOH — ECONOMIC STABILITY: FOOD INSECURITY: WITHIN THE PAST 12 MONTHS, YOU WORRIED THAT YOUR FOOD WOULD RUN OUT BEFORE YOU GOT THE MONEY TO BUY MORE.: NEVER TRUE

## 2025-07-18 SDOH — SOCIAL STABILITY: SOCIAL INSECURITY: ARE YOU MARRIED, WIDOWED, DIVORCED, SEPARATED, NEVER MARRIED, OR LIVING WITH A PARTNER?: MARRIED

## 2025-07-18 SDOH — SOCIAL STABILITY: SOCIAL NETWORK
IN A TYPICAL WEEK, HOW MANY TIMES DO YOU TALK ON THE PHONE WITH FAMILY, FRIENDS, OR NEIGHBORS?: MORE THAN THREE TIMES A WEEK

## 2025-07-18 SDOH — SOCIAL STABILITY: SOCIAL NETWORK: HOW OFTEN DO YOU ATTEND MEETINGS OF THE CLUBS OR ORGANIZATIONS YOU BELONG TO?: MORE THAN 4 TIMES PER YEAR

## 2025-07-18 SDOH — ECONOMIC STABILITY: FOOD INSECURITY: WITHIN THE PAST 12 MONTHS, THE FOOD YOU BOUGHT JUST DIDN'T LAST AND YOU DIDN'T HAVE MONEY TO GET MORE.: NEVER TRUE

## 2025-07-18 SDOH — SOCIAL STABILITY: SOCIAL INSECURITY
WITHIN THE LAST YEAR, HAVE YOU BEEN KICKED, HIT, SLAPPED, OR OTHERWISE PHYSICALLY HURT BY YOUR PARTNER OR EX-PARTNER?: NO

## 2025-07-18 SDOH — ECONOMIC STABILITY: FOOD INSECURITY: HOW HARD IS IT FOR YOU TO PAY FOR THE VERY BASICS LIKE FOOD, HOUSING, MEDICAL CARE, AND HEATING?: NOT HARD AT ALL

## 2025-07-18 SDOH — HEALTH STABILITY: PHYSICAL HEALTH: ON AVERAGE, HOW MANY MINUTES DO YOU ENGAGE IN EXERCISE AT THIS LEVEL?: 20 MIN

## 2025-07-18 SDOH — ECONOMIC STABILITY: HOUSING INSECURITY: IN THE LAST 12 MONTHS, WAS THERE A TIME WHEN YOU WERE NOT ABLE TO PAY THE MORTGAGE OR RENT ON TIME?: NO

## 2025-07-18 SDOH — SOCIAL STABILITY: SOCIAL INSECURITY: WITHIN THE LAST YEAR, HAVE YOU BEEN HUMILIATED OR EMOTIONALLY ABUSED IN OTHER WAYS BY YOUR PARTNER OR EX-PARTNER?: NO

## 2025-07-18 SDOH — SOCIAL STABILITY: SOCIAL NETWORK: HOW OFTEN DO YOU ATTEND CHURCH OR RELIGIOUS SERVICES?: MORE THAN 4 TIMES PER YEAR

## 2025-07-18 SDOH — HEALTH STABILITY: MENTAL HEALTH: HOW OFTEN DO YOU HAVE A DRINK CONTAINING ALCOHOL?: NEVER

## 2025-07-18 SDOH — SOCIAL STABILITY: SOCIAL NETWORK
DO YOU BELONG TO ANY CLUBS OR ORGANIZATIONS SUCH AS CHURCH GROUPS, UNIONS, FRATERNAL OR ATHLETIC GROUPS, OR SCHOOL GROUPS?: YES

## 2025-07-18 SDOH — HEALTH STABILITY: PHYSICAL HEALTH: ON AVERAGE, HOW MANY DAYS PER WEEK DO YOU ENGAGE IN MODERATE TO STRENUOUS EXERCISE (LIKE A BRISK WALK)?: 7 DAYS

## 2025-07-18 SDOH — SOCIAL STABILITY: SOCIAL INSECURITY: WITHIN THE LAST YEAR, HAVE YOU BEEN AFRAID OF YOUR PARTNER OR EX-PARTNER?: NO

## 2025-07-18 SDOH — SOCIAL STABILITY: SOCIAL NETWORK: HOW OFTEN DO YOU GET TOGETHER WITH FRIENDS OR RELATIVES?: THREE TIMES A WEEK

## 2025-07-18 SDOH — HEALTH STABILITY: MENTAL HEALTH: HOW MANY DRINKS CONTAINING ALCOHOL DO YOU HAVE ON A TYPICAL DAY WHEN YOU ARE DRINKING?: PATIENT DOES NOT DRINK

## 2025-07-18 SDOH — ECONOMIC STABILITY: TRANSPORTATION INSECURITY: IN THE PAST 12 MONTHS, HAS LACK OF TRANSPORTATION KEPT YOU FROM MEDICAL APPOINTMENTS OR FROM GETTING MEDICATIONS?: NO

## 2025-07-18 ASSESSMENT — ACTIVITIES OF DAILY LIVING (ADL): LACK_OF_TRANSPORTATION: NO

## 2025-07-18 NOTE — PATIENT INSTRUCTIONS
yellow fruits and vegetables are especially good for you. Examples include spinach, carrots, peaches, and berries.     Foods high in fiber can reduce your cholesterol and provide important vitamins and minerals. High-fiber foods include whole-grain cereals and breads, oatmeal, beans, brown rice, citrus fruits, and apples.     Eat lean proteins. Heart-healthy proteins include seafood, lean meats and poultry, eggs, beans, peas, nuts, seeds, and soy products.     Limit drinks and foods with added sugar. These include candy, desserts, and soda pop.   Heart-healthy lifestyle    If your doctor recommends it, get more exercise. For many people, walking is a good choice. Or you may want to swim, bike, or do other activities. Bit by bit, increase the time you're active every day. Try for at least 30 minutes on most days of the week.     Try to quit or cut back on using tobacco and other nicotine products. This includes smoking and vaping. If you need help quitting, talk to your doctor about stop-smoking programs and medicines. These can increase your chances of quitting for good. Quitting is one of the most important things you can do to protect your heart. It is never too late to quit. Try to avoid secondhand smoke too.     Stay at a weight that's healthy for you. Talk to your doctor if you need help losing weight.     Try to get 7 to 9 hours of sleep each night.     Limit alcohol to 2 drinks a day for men and 1 drink a day for women. Too much alcohol can cause health problems.     Manage other health problems such as diabetes, high blood pressure, and high cholesterol. If you think you may have a problem with alcohol or drug use, talk to your doctor.   Medicines    Take your medicines exactly as prescribed. Call your doctor if you think you are having a problem with your medicine.     If your doctor recommends aspirin, take the amount directed each day. Make sure you take aspirin and not another kind of pain reliever, such

## 2025-07-18 NOTE — PROGRESS NOTES
Medicare Service Recommended Frequency Last Done Due next   Pneumonia vaccine After 65, Prevnar 20 ONCE Please look into when you had your last pneumonia vaccine and which one it was You are due for Prevnar 20. Does not want at this time. Informed patient he can get it done here If he changes his mind.   Influenza vaccine Yearly Your most recent flu shot was on 10/20/2020 You are due for flu shot now. Patient states he will think about getting it next flu season   Zoster/Shingles Shingrix vaccine:  2 shots 2-6 months apart  We have no record of a shingles vaccine for you  You are due for the Shingrix. It is a 2 shot series that can be obtained at the pharmacy    COVID Variable We have no record of a COVID-vaccine for you  Please consider getting a COVID-vaccine    Tetanus vaccine (Td or Tdap) Every 10 years  Your most recent Tdap was on 10/8/2020  These are \"good \"for 10 years so you will be due after 10/8/2030          Mammogram Every 1-2 years (ages ~40-75) N/A N/A   Colorectal Cancer Screening FIT test yearly, Cologuard every 3 years, Colonoscopy every 10 years *unless otherwise indicated* You noted that you had a colonoscopy in 2021 in [place].  It showed that you needed a repeat in 5 years so after 4/1/2026    Prostate Cancer Shared decision making with patient depending on family history and risk Per chart review, no need for PSA at thsi time Not recommended at this age per USPSTF   Cardiovascular/cholesterol screening As determined by your physician 5/3/2024  Total cholesterol: 167  Triglycerides: 88  HDL (good cholesterol): 54  LDL (bad cholesterol): 95 As determined by your physician. Will check lipid panel   Diabetes screening   As determined by your physician 8/10/23  A1c/glucose: 94 As determined by your physician         Screening for abdominal aortic aneurysm One-time screening in patients if you have a family history of abdominal aortic aneurysms, or you're a man 65-75 and have smoked at least 100

## 2025-07-22 ENCOUNTER — HOSPITAL ENCOUNTER (OUTPATIENT)
Age: 75
Discharge: HOME | End: 2025-07-22
Payer: MEDICARE

## 2025-07-22 DIAGNOSIS — Z00.00 MEDICARE ANNUAL WELLNESS VISIT, SUBSEQUENT: ICD-10-CM

## 2025-07-22 LAB
CHOLEST SERPL-MCNC: 225 MG/DL (ref 0–199)
CHOLESTEROL/HDL RATIO: 5.2
HDLC SERPL-MCNC: 43 MG/DL
LDLC SERPL CALC-MCNC: 152 MG/DL (ref 0–100)
TRIGL SERPL-MCNC: 151 MG/DL
VLDLC SERPL CALC-MCNC: 30 MG/DL (ref 1–30)

## 2025-07-22 PROCEDURE — 36415 COLL VENOUS BLD VENIPUNCTURE: CPT

## 2025-07-22 PROCEDURE — 80061 LIPID PANEL: CPT

## 2025-08-15 ENCOUNTER — TELEPHONE (OUTPATIENT)
Dept: PHARMACY | Facility: CLINIC | Age: 75
End: 2025-08-15

## 2025-08-24 DIAGNOSIS — E78.00 PURE HYPERCHOLESTEROLEMIA: ICD-10-CM

## 2025-08-24 DIAGNOSIS — E03.9 ACQUIRED HYPOTHYROIDISM: ICD-10-CM

## 2025-08-25 RX ORDER — LEVOTHYROXINE SODIUM 50 UG/1
75 TABLET ORAL DAILY
Qty: 90 TABLET | Refills: 1 | Status: SHIPPED | OUTPATIENT
Start: 2025-08-25

## 2025-08-25 RX ORDER — ROSUVASTATIN CALCIUM 20 MG/1
20 TABLET, COATED ORAL DAILY
Qty: 90 TABLET | Refills: 1 | Status: SHIPPED | OUTPATIENT
Start: 2025-08-25